# Patient Record
Sex: MALE | Race: OTHER | HISPANIC OR LATINO | URBAN - METROPOLITAN AREA
[De-identification: names, ages, dates, MRNs, and addresses within clinical notes are randomized per-mention and may not be internally consistent; named-entity substitution may affect disease eponyms.]

---

## 2017-01-24 ENCOUNTER — OUTPATIENT (OUTPATIENT)
Dept: OUTPATIENT SERVICES | Facility: HOSPITAL | Age: 22
LOS: 1 days | Discharge: HOME | End: 2017-01-24

## 2017-06-27 DIAGNOSIS — M94.211 CHONDROMALACIA, RIGHT SHOULDER: ICD-10-CM

## 2017-06-27 DIAGNOSIS — Z88.1 ALLERGY STATUS TO OTHER ANTIBIOTIC AGENTS STATUS: ICD-10-CM

## 2017-06-27 DIAGNOSIS — S43.431A SUPERIOR GLENOID LABRUM LESION OF RIGHT SHOULDER, INITIAL ENCOUNTER: ICD-10-CM
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
## 2017-06-27 DIAGNOSIS — M75.101 UNSPECIFIED ROTATOR CUFF TEAR OR RUPTURE OF RIGHT SHOULDER, NOT SPECIFIED AS TRAUMATIC: ICD-10-CM
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
## 2017-06-27 DIAGNOSIS — M75.51 BURSITIS OF RIGHT SHOULDER: ICD-10-CM
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
## 2024-02-26 ENCOUNTER — NON-APPOINTMENT (OUTPATIENT)
Age: 29
End: 2024-02-26

## 2024-05-13 ENCOUNTER — NON-APPOINTMENT (OUTPATIENT)
Age: 29
End: 2024-05-13

## 2024-08-01 PROBLEM — Z00.00 ENCOUNTER FOR PREVENTIVE HEALTH EXAMINATION: Status: ACTIVE | Noted: 2024-08-01

## 2025-06-01 ENCOUNTER — NON-APPOINTMENT (OUTPATIENT)
Age: 30
End: 2025-06-01

## 2025-06-01 ENCOUNTER — EMERGENCY (EMERGENCY)
Facility: HOSPITAL | Age: 30
LOS: 0 days | Discharge: HOME | End: 2025-06-01
Attending: STUDENT IN AN ORGANIZED HEALTH CARE EDUCATION/TRAINING PROGRAM
Payer: COMMERCIAL

## 2025-06-01 VITALS
SYSTOLIC BLOOD PRESSURE: 136 MMHG | HEART RATE: 75 BPM | WEIGHT: 167.99 LBS | HEIGHT: 64 IN | OXYGEN SATURATION: 98 % | RESPIRATION RATE: 18 BRPM | DIASTOLIC BLOOD PRESSURE: 82 MMHG | TEMPERATURE: 98 F

## 2025-06-01 DIAGNOSIS — R07.89 OTHER CHEST PAIN: ICD-10-CM
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
## 2025-06-01 DIAGNOSIS — R22.2 LOCALIZED SWELLING, MASS AND LUMP, TRUNK: ICD-10-CM

## 2025-06-01 DIAGNOSIS — Z88.1 ALLERGY STATUS TO OTHER ANTIBIOTIC AGENTS: ICD-10-CM

## 2025-06-01 PROCEDURE — 71046 X-RAY EXAM CHEST 2 VIEWS: CPT

## 2025-06-01 PROCEDURE — 93005 ELECTROCARDIOGRAM TRACING: CPT

## 2025-06-01 PROCEDURE — 93010 ELECTROCARDIOGRAM REPORT: CPT

## 2025-06-01 PROCEDURE — 71046 X-RAY EXAM CHEST 2 VIEWS: CPT | Mod: 26

## 2025-06-01 PROCEDURE — 99283 EMERGENCY DEPT VISIT LOW MDM: CPT | Mod: 25

## 2025-06-01 PROCEDURE — 99284 EMERGENCY DEPT VISIT MOD MDM: CPT

## 2025-06-01 RX ORDER — ACETAMINOPHEN 500 MG/5ML
650 LIQUID (ML) ORAL ONCE
Refills: 0 | Status: COMPLETED | OUTPATIENT
Start: 2025-06-01 | End: 2025-06-01

## 2025-06-01 RX ADMIN — Medication 650 MILLIGRAM(S): at 19:10

## 2025-06-01 NOTE — ED PROVIDER NOTE - NSFOLLOWUPINSTRUCTIONS_ED_ALL_ED_FT
Follow up with your PMD this week.    Rib Fracture    A rib fracture is a break or crack in one of the bones of the ribs. The ribs are a group of long, curved bones that wrap around your chest and attach to your spine. They protect your lungs and other organs in the chest cavity. A broken or cracked rib is often painful, but most do not cause other problems. Most rib fractures heal on their own over time. However, rib fractures can be more serious if multiple ribs are broken or if broken ribs move out of place and push against other structures.     CAUSES  A direct blow to the chest. For example, this could happen during contact sports, a car accident, or a fall against a hard object.   Repetitive movements with high force, such as pitching a baseball or having severe coughing spells.     SYMPTOMS  Pain when you breathe in or cough.   Pain when someone presses on the injured area.     DIAGNOSIS  Your caregiver will perform a physical exam. Various imaging tests may be ordered to confirm the diagnosis and to look for related injuries. These tests may include a chest X-ray, computed tomography (CT), magnetic resonance imaging (MRI), or a bone scan.    TREATMENT  Rib fractures usually heal on their own in 1–3 months. The longer healing period is often associated with a continued cough or other aggravating activities. During the healing period, pain control is very important. Medication is usually given to control pain. Hospitalization or surgery may be needed for more severe injuries, such as those in which multiple ribs are broken or the ribs have moved out of place.     HOME CARE INSTRUCTIONS  Avoid strenuous activity and any activities or movements that cause pain. Be careful during activities and avoid bumping the injured rib.   Gradually increase activity as directed by your caregiver.  Only take over-the-counter or prescription medications as directed by your caregiver. Do not take other medications without asking your caregiver first.   Apply ice to the injured area for the first 1–2 days after you have been treated or as directed by your caregiver. Applying ice helps to reduce inflammation and pain.   Put ice in a plastic bag.  Place a towel between your skin and the bag.    Leave the ice on for 15–20 minutes at a time, every 2 hours while you are awake.  Perform deep breathing as directed by your caregiver. This will help prevent pneumonia, which is a common complication of a broken rib. Your caregiver may instruct you to:  Take deep breaths several times a day.   Try to cough several times a day, holding a pillow against the injured area.   Use a device called an incentive spirometer to practice deep breathing several times a day.  Drink enough fluids to keep your urine clear or pale yellow. This will help you avoid constipation.    Do not wear a rib belt or binder. These restrict breathing, which can lead to pneumonia.      SEEK IMMEDIATE MEDICAL CARE IF:  You have a fever.    You have difficulty breathing or shortness of breath.    You develop a continual cough, or you cough up thick or bloody sputum.  You feel sick to your stomach (nausea), throw up (vomit), or have abdominal pain.    You have worsening pain not controlled with medications.      MAKE SURE YOU:  Understand these instructions.   Will watch your condition.   Will get help right away if you are not doing well or get worse.     ADDITIONAL NOTES AND INSTRUCTIONS    Please follow up with your Primary MD in 24-48 hr.  Seek immediate medical care for any new/worsening signs or symptoms.

## 2025-06-01 NOTE — ED PROVIDER NOTE - PATIENT PORTAL LINK FT
You can access the FollowMyHealth Patient Portal offered by [facility] by registering at the following website: [email or web address]. By joining [employer]’s FollowMyHealth portal, you will also be able to view your health information using other applications (apps) compatible with our system.

## 2025-06-01 NOTE — ED ADULT TRIAGE NOTE - CHIEF COMPLAINT QUOTE
Pt reports left sided chest pain starting last week. Pt as elbowed in the chest playing sports and heard a "crack"

## 2025-06-01 NOTE — ED PROCEDURE NOTE - CPROC ED INFORMED CONSENT1
Benefits, risks, and possible complications of procedure explained to patient/caregiver who verbalized understanding and gave verbal consent.
General

## 2025-06-01 NOTE — ED PROVIDER NOTE - PHYSICAL EXAMINATION
VITAL SIGNS: I have reviewed nursing notes and confirm.  CONSTITUTIONAL: In no acute distress.  SKIN: Skin exam is warm and dry.  HEAD: Normocephalic; atraumatic.  EYES: PERRL, EOM intact; conjunctiva and sclera clear.  ENT: No nasal discharge; airway clear.   NECK: Supple; non tender.  CARD: S1, S2; Regular rate and rhythm.  RESP: No wheezes, rales or rhonchi. Speaking in full sentences. TTP to the 3rd anterior rib, just lateral to the sternum with mild swelling.   ABD: Soft, non-tender.   EXT: Normal ROM. No extremity tenderness.   NEURO: Alert, oriented. Grossly unremarkable. No focal deficits.

## 2025-06-01 NOTE — ED PROVIDER NOTE - OBJECTIVE STATEMENT
Patient is a 29-year-old male no PMH who presents ED with complaints of left-sided anterior rib pain for the past week.  Patient reports that 1 week ago he sustained injury while playing soccer; states that another patient elbowed him right into the chest.  Since then has been having pain exacerbated with deep breaths movements.  Patient was seen at urgent care and sent to ED for further evaluation.  Patient took Motrin today for the first time with some relief.  Denies fever, chills, shortness of breath, hemoptysis, nausea, vomiting, or other injury.  Denies head strike or LOC.

## 2025-06-01 NOTE — ED PROVIDER NOTE - CLINICAL SUMMARY MEDICAL DECISION MAKING FREE TEXT BOX
Patient is a 29-year-old male no PMH who presents ED with complaints of left-sided anterior rib pain for the past week.  Patient reports that 1 week ago he sustained injury while playing soccer while being elbowed to the left side of the chest. no sob, n./v no other injuries. cxr negative for pneumothorax or hemothorax, given meds pt feels better dc with outpatient pmd follow up  Appropriate medications for patient's presenting complaints were ordered and effects were reassessed. Patient's external records were reviewed    Escalation to admission and/or observation was considered.  Patient feels much better and is comfortable with discharge.  Appropriate follow-up was arranged.

## 2025-06-01 NOTE — ED PROVIDER NOTE - PROGRESS NOTE DETAILS
CR: Discussed x-ray results with patient.  Discussed necessity for pain control.  Return precautions discussed at length.  Patient follow-up with PMD.

## 2025-06-01 NOTE — ED PROVIDER NOTE - ATTENDING APP SHARED VISIT CONTRIBUTION OF CARE
Patient is a 29-year-old male no PMH who presents ED with complaints of left-sided anterior rib pain for the past week.  Patient reports that 1 week ago he sustained injury while playing soccer while being elbowed to the left side of the chest. no sob, n./v no other injuries  CONSTITUTIONAL: Well developed; in no acute distress  HEAD: Normocephalic; atraumatic  EYES: No conjunctival injection, no scleral icterus  ENT:No nasal discharge; airway clear.  NECK: Supple; non tender.  CARD: Warm and well perfused, not tachycardic  RESP: Breathing comfortably on RA, speaking in full sentences w/o distress  Abdomen: Soft, non-tender, non-distended   EXT: No gross deformities, normal ROM.  tenderness on the left parasternal area  SKIN: Warm and dry  NEURO: Alert, oriented, motor and sensory grossly intact  PSYCH: Calm, cooperative